# Patient Record
Sex: MALE | Race: WHITE
[De-identification: names, ages, dates, MRNs, and addresses within clinical notes are randomized per-mention and may not be internally consistent; named-entity substitution may affect disease eponyms.]

---

## 2018-02-02 ENCOUNTER — HOSPITAL ENCOUNTER (INPATIENT)
Dept: HOSPITAL 45 - C.MHU | Age: 48
LOS: 3 days | Discharge: HOME | DRG: 881 | End: 2018-02-05
Attending: PSYCHIATRY & NEUROLOGY | Admitting: PSYCHIATRY & NEUROLOGY
Payer: SELF-PAY

## 2018-02-02 VITALS
BODY MASS INDEX: 23.13 KG/M2 | WEIGHT: 161.6 LBS | HEIGHT: 70 IN | WEIGHT: 161.6 LBS | BODY MASS INDEX: 23.13 KG/M2 | HEIGHT: 70 IN

## 2018-02-02 VITALS — SYSTOLIC BLOOD PRESSURE: 141 MMHG | TEMPERATURE: 97.88 F | HEART RATE: 127 BPM | DIASTOLIC BLOOD PRESSURE: 79 MMHG

## 2018-02-02 DIAGNOSIS — F17.200: ICD-10-CM

## 2018-02-02 DIAGNOSIS — F32.9: Primary | ICD-10-CM

## 2018-02-02 DIAGNOSIS — E78.5: ICD-10-CM

## 2018-02-02 DIAGNOSIS — K21.9: ICD-10-CM

## 2018-02-02 DIAGNOSIS — Z79.899: ICD-10-CM

## 2018-02-02 RX ADMIN — NICOTINE SCH PATCH: 21 PATCH, EXTENDED RELEASE TRANSDERMAL at 14:21

## 2018-02-02 RX ADMIN — ATORVASTATIN CALCIUM SCH MG: 40 TABLET, FILM COATED ORAL at 21:17

## 2018-02-02 NOTE — PSYCHIATRIC HISTORY & PHYSICAL
History


Date of Service


Feb 2, 2018.





Identifying Data





Ty Carolina is a 47-year-old male admitted on Feb 2, 2018 at 12:15 who 

currently lives in [place] with his wife and mother-in-law.  Ty Carolina was 

admitted on a 201 voluntary commitment.  Patient is admitted after transfer 

from the [facility] ED.  The patient was brought to the unit after 

transfer from [facility].  Information provided by the patient is 

considered to be reliable, with exception of medication history.





Chief Complaint


"The obvious things is I lost my medication".





History of Present Illness


Ty Carolina is a 47-year-old  male admitted to [address] [place] [address] following 

acceptance from [facility] ED.  Pt reports having been off medications 

for the past month after losing his job and subsequently losing his insurance.  

He also reports increased stress as his mother-in-law has been living in his 

home and requires a considerable amount of care.  Pt states he and his wife 

were in an argument yesterday in which she had threatened to leave and he had 

said, "well if I don't have a job or a wife, I might as well just shoot myself"

.  Pt's mother and brother were informed if the comment and he was brought to 

the ED for mental health evaluation.  Pt was agreeable to inpatient treatment.  





Pt states he has struggled with depression for several years, with worsening in 

the past 2-3.  He reports low mood, decreased energy, hopelessness for the past 

few days, and feeling as though he is a burden.  Pt reports occasional passive 

SI over the past few years, but states he had never formed a plan or had intent 

or acts of furtherance.  He denies contemplation of suicide prior to the 

argument with his wife and states the comment lacked intent.  Pt reports 

anxiety as well, reporting irritability and feeling "on-edge".  He denies 

history of panic attacks.  





Pt current receives prescriptions from [facility].  He was prescribed 

risperidone 3mg, venlafaxine 150mg, and clonazepam 0.5mg BID-TID.  Pt denies SI/

HI, SIB, A/V hallucinations, paranoia, manuel, OCD, PTSD, eating disorder, and 

other psychosis.





Past Psychiatric History


Current OP Treatment:  psychiatrist (Evan)


Prior OP Treatment:  no prior treatment


Prior Psych Hospitalizations:  none


Access to a Gun:  No (per pt brother has his gun)


Suicide Attempts:  No





Past Medical/Surgical History


History of Concussion/Seizure:  No








Allergies


Allergies:  


Coded Allergies:  


     Ziprasidone (Unverified  Allergy, Unknown, SHORTNESS OF BREATH, 2/2/18)





Home Medications


Scheduled


Atorvastatin (Lipitor), 1 TAB PO HS


Fenofibrate (Tricor ), 134 MG PO DAILY


Omeprazole (Prilosec), 40 MG PO DAILY


Risperidone (Risperdal), 3 MG PO HS


Venlafaxine Hcl (Effexor Xr), 1 CAP PO DAILY





Scheduled PRN


Clonazepam (Klonopin), 0.5 MG PO TID PRN for anxiety/insomnia





Family History


History of Suicide:  No


History of Substance Abuse:  Yes (Dad - Alcoholic)


Psychiatric History:  No





Alcohol Use


Alcohol Use In Past 12 Months:  No


AUDIT Total Score:  0





Smoking Use


Smoking Status:  Current Every Day Smoker


1 - 1.5 ppd





Personal History


Lives in:  [place]


Childhood:


Pt was raised primarily by his mother after the death of his father.  He 

reports a diagnosis of dyslexia, difficulty concentrating, and unknown learning 

disability.


Education:  graduated from high school


Work History:


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  Reports being 

fired from his most recent position about 1 month ago.


Relationship History:  


Children:  none


Spiritual Affiliation:  none


Legal History:  none


Psychological Trauma History:  Denies Hx Traumatic Event





Review of Systems


Psych:  denies symptoms other than stated above


Constitutional: denied


Cardiovascular: denied


Respiratory:  reports chronic cough from smoking


GI: history of indigestion, worsening


Neurologic: denied


Remainder of 10 body systems also reviewed and denied other than noted above.





Examination


Physical Examination


A physical exam was performed in ER at [facility] by Gumaro HYDE

, prior to admission.  Pt received medical clearance from [facility] 

prior to transfer to this facility.  I accept that physical as correct/medical 

clearance for the inpatient physical exam.





Vital Signs





Vital Signs Past 12 Hours








  Date Time  Temp Pulse Resp B/P (MAP) Pulse Ox O2 Delivery O2 Flow Rate FiO2


 


2/2/18 13:17 36.6 127 18 141/79    











Mental Examination


During interview pt is:  alert and oriented, cooperative


Appearance:  appropriately dressed (in t-shirt and scrub pants), appropriately 

groomed


Eye contact is:  fair


Motor behavior is:  steady gait & station, no abnormal motor movements


Speech:  normal in rate, rhythm & volume


Affect:  depressed, tearful


Mood is:  depressed


Thought process:  goal directed, clear, coherent


Thought content:  reality based without delusions


Suicidal thought are:  denied (suicidal statement of "I'll just shoot myself" 

prior to transfer), Plan: denied, Intent: denied


Homicidal thoughts are:  denied


Hallucinations:  denies auditory, denies visual


Cognition:  attention grossly intact, language grossly intact, other (memory 

impairment with respect to medications)


Intelligence estimated to be:  average


Insight:  limited


Judgement:  limited





Impression / Recommendations


Impression


47-year-old  male admitted to [address] [place] [address] following acceptance from [facility] ED.  Pt states suicidal statements were made in the context of 

argument with his wife.  He denies SI prior to the argument and denies intent 

or acts of furtherance.  Pt reports worsening of mood as he has not been taking 

his medications for the past month.  He reports desire to be restarted on his 

medications.  As patient has lost his insurance and Medical Assistance coverage 

is not guaranteed, will plan to start on generic medications available through 

[facility]'s $4 prescription plan to maximize compliance once discharged.  Will 

start risperidone 1mg qHS with plan to convert to haloperidol at discharge.  Pt 

given risperidone 1mg BID prn for anxiety and agitation.  Will start fluoxetine 

20mg qAM due to availability and decent response to this medication in the 

past.  Pt informed of hydroxyzine available prn for anxiety with plans to 

discontinue clonazepam use if effective.  Diagnosis at this point is unclear as 

he reports depression and anxiety, but has been on at least ziprasidone and 

risperidone in the past, seeking clarification from outpatient records.  Pt has 

signed 72-hour notice shortly after admission.





Inventory Assets


Strengths:


willingness for treatment, stable outpatient provider


Needs:


mitigate risk factors, resolution of current relationship stressors





Risk Factors Assessment


Male:  Yes


:  Yes


/single/:  No


Access to guns:  No (per pt brother has his gun)


Health problems:  No


Mental Health Diagnoses:  Yes


Substance use disorders:  No


Previous attempt:  No


Previous psychiatric stay:  No


Hopelessness:  Yes


Smoker:  Yes





Protective Factors Assessment


Moravian beliefs:  No


:  Yes


Responsible for young children:  No


Employed:  No


Supportive family:  Yes


Good rapport with provider:  No





Recommendations





(1) Depressive disorder


2/2 


   - Start risperidone 1mg qHS, prn dosing of 1mg BID for anxiety/agitation.  

Started on fluoxetine 20mg qAM for antidepressant coverage.


   - Fasting glucose and lipid panel ordered for tomorrow AM.


   - Request records from patient's outpatient providers


   - Encourage participation in group programming including recreational 

therapy and group therapy


   - Encourage phone/in-person meeting with identified supports. 








CPT Code


Initial Hospital Care:  39897

## 2018-02-02 NOTE — PSYCHIATRIC HISTORY & PHYSICAL
Psychiatric History & Physical





Date of Service


Feb 2, 2018.





Identifying Data





Ty Carolina is a 47-year-old male admitted on Feb 2, 2018 at 12:15 who 

currently lives in [place] with his wife and mother-in-law.  Ty Carolina was 

admitted on a 201 voluntary commitment.  Patient is admitted after transfer 

from the [facility] ED.  The patient was brought to the unit after 

transfer from [facility].  Information provided by the patient is 

considered to be reliable, with exception of medication history.





Chief Complaint


"The obvious thing is I lost my medication".





History of Present Illness


Ty Carolina is a 47-year-old  male admitted to [address] [place] [address] following 

acceptance from [facility] ED.  Pt reports having been off medications 

for the past month after losing his job and subsequently losing his insurance.  

He also reports increased stress as his mother-in-law has been living in his 

home and requires a considerable amount of care.  Pt states he and his wife 

were in an argument yesterday in which she had threatened to leave and he had 

said, "well if I don't have a job or a wife, I might as well just shoot myself"

.  Pt's mother and brother were informed if the comment and he was brought to 

the ED for mental health evaluation.  Pt was agreeable to inpatient treatment.  





Pt told Farhad Watkins PA-C that he has struggled with depression for several 

years, with worsening in the past 2-3.  He reports low mood, decreased energy, 

hopelessness for the past few days, and feeling as though he is a burden.  Pt 

reports occasional passive SI over the past few years, but states he had never 

formed a plan or had intent or acts of furtherance.  He denies contemplation of 

suicide prior to the argument with his wife and states the comment lacked 

intent.  Pt reports anxiety as well, reporting irritability and feeling "on-edge

".  He denies history of panic attacks.  





Pt current receives prescriptions from [facility].  He was prescribed 

risperidone 3mg, venlafaxine 150mg, and clonazepam 0.5mg BID-TID.  Pt denies SI/

HI, SIB, A/V hallucinations, paranoia, manuel, OCD, PTSD, eating disorder, and 

other psychosis.





Past Psychiatric History


Current OP Treatment:  psychiatrist (George)


Prior OP Treatment:  no prior treatment


Prior Psych Hospitalizations:  none


Access to a Gun:  No (per pt brother has his gun)


Suicide Attempts:  No





Past Medical/Surgical History


History of Concussion/Seizure:  No








Allergies


Allergies:  


Coded Allergies:  


     Ziprasidone (Unverified  Allergy, Unknown, SHORTNESS OF BREATH, 2/2/18)





Home Medications


Scheduled


Atorvastatin (Lipitor), 1 TAB PO HS


Fenofibrate (Tricor ), 134 MG PO DAILY


Omeprazole (Prilosec), 40 MG PO DAILY


Risperidone (Risperdal), 3 MG PO HS


Venlafaxine Hcl (Effexor Xr), 1 CAP PO DAILY





Scheduled PRN


Clonazepam (Klonopin), 0.5 MG PO TID PRN for anxiety/insomnia





Family History


History of Suicide:  No


History of Substance Abuse:  Yes (Dad - Alcoholic)


Psychiatric History:  No





Alcohol Use


Alcohol Use In Past 12 Months:  No


AUDIT Total Score:  0





Smoking Use


Smoking Status:  Current Every Day Smoker


1 - 1.5 ppd





Personal History


Lives in:  [place]


Childhood:


Pt was raised primarily by his mother after the death of his father.  He 

reports a diagnosis of dyslexia, difficulty concentrating, and unknown learning 

disability.


Education:  graduated from high school


Work History:


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  Reports being 

fired from his most recent position about 1 month ago.


Relationship History:  


Children:  none


Spiritual Affiliation:  none


Legal History:  none


Psychological Trauma History:  Denies Hx Traumatic Event





Review of Systems


Psych:  denies symptoms other than stated above


Constitutional: denied


Cardiovascular: denied


Respiratory:  reports chronic cough from smoking


GI: history of indigestion, worsening


Neurologic: denied


Remainder of 10 body systems also reviewed and denied other than noted above.





Examination


Physical Examination


A physical exam was performed in ER at [facility] by Gumaro HYDE

, prior to admission.  Pt received medical clearance from [facility] 

prior to transfer to this facility.  I accept that physical as correct/medical 

clearance for the inpatient physical exam.





Vital Signs





Vital Signs Past 12 Hours








  Date Time  Temp Pulse Resp B/P (MAP) Pulse Ox O2 Delivery O2 Flow Rate FiO2


 


2/2/18 13:17 36.6 127 18 141/79    











Mental Examination


During interview pt is:  alert and oriented, cooperative


Appearance:  appropriately dressed (in t-shirt and scrub pants), appropriately 

groomed


Eye contact is:  fair


Motor behavior is:  steady gait & station, no abnormal motor movements


Speech:  normal in rate, rhythm & volume


Affect:  depressed, tearful


Mood is:  depressed


Thought process:  goal directed, clear, coherent


Thought content:  reality based without delusions


Suicidal thought are:  denied (suicidal statement of "I'll just shoot myself" 

prior to transfer), Plan: denied, Intent: denied


Homicidal thoughts are:  denied


Hallucinations:  denies auditory, denies visual


Cognition:  attention grossly intact, language grossly intact, other (memory 

impairment with respect to medications)


Intelligence estimated to be:  average


Insight:  limited


Judgement:  limited





Impression / Recommendations


Impression


47-year-old  male admitted to [address] [place] [address] following acceptance from [facility] ED.  Pt states suicidal statements were made in the context of 

argument with his wife.  He denies SI prior to the argument and denies intent 

or acts of furtherance.  Pt reports worsening of mood as he has not been taking 

his medications for the past month.  He reports desire to be restarted on his 

medications.  As patient has lost his insurance and Medical Assistance coverage 

is not guaranteed, will plan to start on generic medications available through 

[facility]'s $4 prescription plan to maximize compliance once discharged.  Will 

start risperidone 1mg qHS with plan to convert to haloperidol at discharge if 

cost remains a facot.  Pt given risperidone 1mg BID prn for anxiety and 

agitation.  Will start fluoxetine 20mg qAM due to availability and decent 

response to this medication in the past.  Pt informed of hydroxyzine available 

prn for anxiety with plans to discontinue clonazepam use if effective.  

Diagnosis at this point is unclear as he reports depression and anxiety, but 

has been on at least ziprasidone and risperidone in the past, seeking 

clarification from outpatient records.  Pt has signed 72-hour notice shortly 

after admission.





Inventory Assets


Strengths:


willingness for treatment, stable outpatient provider


Needs:


mitigate risk factors, resolution of current relationship stressors





Risk Factors Assessment


Male:  Yes


:  Yes


/single/:  No


Access to guns:  No (per pt brother has his gun)


Health problems:  No


Mental Health Diagnoses:  Yes


Substance use disorders:  No


Previous attempt:  No


Previous psychiatric stay:  No


Hopelessness:  Yes


Smoker:  Yes





Protective Factors Assessment


Restorationist beliefs:  No


:  Yes


Responsible for young children:  No


Employed:  No


Supportive family:  Yes


Good rapport with provider:  No





Recommendations





(1) Depressive disorder


2/2 


   - Restart risperidone at lower dose of 1mg qHS, prn dosing of 1mg BID for 

anxiety/agitation.  Started on fluoxetine 20mg qAM for antidepressant coverage.


   - Fasting glucose and lipid panel ordered for tomorrow AM as reviewed need 

for metabolic monitoring, baseline AIMS appears 0


   - Request records from patient's outpatient providers


   -The patient is admitted to [facility] ([facility] inpatient mental health unit) on 

q 15 min checks (behavioral with suicide precautions) for safety.  The patient 

will participate in group, recreational and milieu therapies and will be 

offered additional individual and family sessions as clinically appropriate.  








CPT Code


Initial Hospital Care:  23155

## 2018-02-03 VITALS — TEMPERATURE: 97.88 F | SYSTOLIC BLOOD PRESSURE: 116 MMHG | DIASTOLIC BLOOD PRESSURE: 87 MMHG | HEART RATE: 92 BPM

## 2018-02-03 LAB
KETONES UR QL STRIP: 158 MG/DL
PH UR: 239 MG/DL (ref 0–200)

## 2018-02-03 RX ADMIN — PANTOPRAZOLE SCH MG: 40 TABLET, DELAYED RELEASE ORAL at 07:43

## 2018-02-03 RX ADMIN — ATORVASTATIN CALCIUM SCH MG: 40 TABLET, FILM COATED ORAL at 21:10

## 2018-02-03 RX ADMIN — FLUOXETINE SCH MG: 20 CAPSULE ORAL at 07:43

## 2018-02-03 RX ADMIN — HALOPERIDOL SCH MG: 1 TABLET ORAL at 21:08

## 2018-02-03 RX ADMIN — NICOTINE SCH PATCH: 21 PATCH, EXTENDED RELEASE TRANSDERMAL at 07:42

## 2018-02-03 RX ADMIN — Medication SCH INTER.UNIT: at 07:43

## 2018-02-03 NOTE — PSYCHIATRIC PROGRESS NOTES
Progress Note


Date of Service


Feb 3, 2018.





Interval History


Ty Carolina is a 47-year-old male admitted on Feb 2, 2018 at 12:15 who 

currently lives in [place] with his wife and mother-in-law.  Ty Carolina was 

admitted on a 201 voluntary commitment.  Patient is admitted after transfer 

from the [facility] ED.  The patient was brought to the unit after 

transfer from [facility].  Information provided by the patient is 

considered to be reliable, with exception of medication history.





Chief Complaint


"I had a hard time sleeping".





Subjective


Patient was seen & assessed interval progress reviewed with Nursing.  Staff 

reports calls made to [facility] ED to discuss transportation, awaiting response.  

Pt follows with psychiatric providers at [facility].  Pt was seen 

today to assess progress since admission.  Pt reports feeling "better since I'

ve gotten here".  He reports difficulty sleeping last evening and received a 

prn dose of Vistaril for assistance.  Pt is continuing to endorse his plan to 

leave at the end of his 72-hour notice up on 2/5 at ~1300.  Pt states he called 

his mother last evening and was able to text his wife to let her know he was 

doing well.  Pt states, per his mother, his wife is still in their home 

awaiting his return.  We reviewed lab results including elevated fasting glucose

, triglycerides, and cholesterol.  Medication changes were also discussed to 

allow for compliance in regard to cost.  Pt participated appropriately in the 

conversation.  He denies SI/HI, A/V hallucinations.  He denies other concerns 

or needs today.





Review of Systems


Psych:  denies symptoms other than stated above


Constitutional: denied


Cardiovascular: denied


GI: denied


Neurologic: denied


Remainder of 10 body systems also reviewed and denied other than noted above.





Sleep Information


Total Hours of Sleep:  10.00





Meal Information


Percent of Breakfast Consumed:  75


Percent of Lunch Consumed:  35


Percent of Dinner Consumed:  0





Mental Status Exam


During interview pt is:  alert and oriented, cooperative


Appearance:  appropriately dressed, appropriately groomed


Eye contact is:  good


Motor behavior is:  steady gait & station, no abnormal motor movements


Speech:  normal in rate, rhythm & volume


Affect:  blunted


Mood is:  other ("better")


Thought process:  goal directed, clear, coherent


Thought content:  reality based without delusions


Suicidal thought are:  denied (admitted following threat to "shoot myself"), 

Plan: denied, Intent: denied


Homicidal thoughts are:  denied


Hallucinations:  denies auditory, denies visual


Cognition:  attention grossly intact, language grossly intact, other (memory 

impairment with respect to medications)


Intelligence estimated to be:  average


Insight:  limited


Judgement:  limited





Impression


Improvement reported in mood since admission.  72-hour notice up 2/5 at ~1300.  

Unable to obtain records from patient's outpatient provider to clarify diagnosis

, and will therefore keep low-dose antipsychotic on board.  Pt agreeable to 

trial of 1mg Haldol qHS to determine response and potential side effects prior 

to discharge.  Attempts being made to consolidate medications to those 

available on the $4 list through his [facility] pharmacy.  Risks, benefits, side 

effects, and alternatives were discussed.  Pt verbalized understanding and is 

agreeable to the plan above.  Will continue Prozac at 20mg at first dose was 

this AM.  Pt encouraged to request Vistaril as needed for anxiety throughout 

the day. Pt requires ongoing inpatient mental health treatment in order to 

clarify diagnosis, make medication changes, and assess response.  Pt is at risk 

of decompensation and self-harm if discharged prematurely.





Plan





(1) Depressive disorder


2/2 


   - Start risperidone 1mg qHS, prn dosing of 1mg BID for anxiety/agitation.  

Started on fluoxetine 20mg qAM for antidepressant coverage.


   - Fasting glucose and lipid panel ordered for tomorrow AM.


   - Request records from patient's outpatient providers


   - Encourage participation in group programming including recreational 

therapy and group therapy


   - Encourage phone/in-person meeting with identified supports. 





2/3 


   - D/C Risperdal in favor of Haldol 1mg qHS.  Continue Prozac 20mg as above. 

Encourage use of prn Vistaril for anxiety.


   - Fasting labs reviewed.  Elevated glucose-106; triglycerides-285, and total 

cholesterol-239.  Other parameters within normal range.  Discussed need for 

ongoing monitoring with antipsychotic treatment as patient is already receiving 

medications for hyperlipidemia. 


   - Pt receiving Vitamin D supplementation daily for low Vitamin D level per 

-[place] ED labs. 


   - Encourage participation in group programming


   - Attempt to schedule phone meetings with wife or other family members. 








Discharge / Aftercare Planning


Primary Care Physician:  


   Name:  dewayne.


Therapist:  


   Name:  amanda


:  


   Name:  amanda





Visit Code


E&M Code:  02484





Inventory Assets


Strengths:


willingness for treatment, stable outpatient provider


Needs:


mitigate risk factors, resolution of current relationship stressors





Risk Factors Assessment


Male:  Yes


:  Yes


/single/:  No


Health problems:  No


Mental Health Diagnoses:  Yes


Substance use disorders:  No


Previous attempt:  No


Previous psychiatric stay:  No


Hopelessness:  Yes


Smoker:  Yes





Protective Factors Assessment


Latter day beliefs:  No


:  Yes


Responsible for young children:  No


Employed:  No


Supportive family:  Yes


Good rapport with provider:  No





Data


Vital Signs Last 24 Hrs:











  Date Time  Temp Pulse Resp B/P (MAP) Pulse Ox O2 Delivery O2 Flow Rate FiO2


 


2/3/18 07:01 36.6 92 16 101/70    





  118  116/87    








Meds Administered Last 24 Hrs:





Meds Administered (Past 24Hrs)








 Medications


  (Trade)  Dose


 Ordered  Sig/Ivan


 Route  Start Time


 Stop Time Status Last Admin


Dose Admin


 


 Hydroxyzine HCl


  (Vistaril Tab)  25 mg  Q4H  PRN


 PO  2/2/18 13:00


 3/4/18 12:59  2/3/18 09:55


25 MG


 


 Nicotine


  (Nicoderm Cq


 21MG Patch)  1 patch  QAM


 EXT  2/2/18 14:00


 3/4/18 13:59  2/3/18 07:42


1 PATCH


 


 Miscellaneous


  (Remove Nicoderm


 Patch)  1 ea  QAM


 N/A  2/3/18 09:00


 3/5/18 08:59  2/3/18 07:42


1 EA


 


 Fluoxetine HCl


  (Prozac Cap)  20 mg  QAM


 PO  2/3/18 09:00


 3/5/18 08:59  2/3/18 07:43


20 MG


 


 Risperidone


  (Risperdal Tab)  1 mg  HS


 PO  2/2/18 22:00


 2/3/18 09:00 DC 2/2/18 21:17


1 MG


 


 Risperidone


  (Risperdal Tab)  1 mg  BID  PRN


 PO  2/2/18 15:45


 2/3/18 09:00 DC 2/3/18 07:43


1 MG


 


 Hydroxyzine HCl


  (Vistaril Tab)  50 mg  HS  ONCE


 PO  2/2/18 22:00


 2/2/18 22:01 DC 2/2/18 21:18


50 MG


 


 Atorvastatin


 Calcium


  (Lipitor Tab)  80 mg  HS


 PO  2/2/18 22:00


 3/4/18 21:59  2/2/18 21:17


80 MG


 


 Pantoprazole


 Sodium


  (Protonix Tab)  40 mg  DAILY


 PO  2/3/18 09:00


 3/5/18 08:59  2/3/18 07:43


40 MG


 


 Cholecalciferol


  (Vitamin D Tab)  1,000


 inter.unit  QAM


 PO  2/3/18 09:00


 3/5/18 08:59  2/3/18 07:43


1,000 INTER.UNIT








Lab Results Last 24 Hrs:





Last 24 Hours








Test


  2/3/18


07:05


 


Fasting Glucose 106 mg/dl 


 


Triglycerides Level 285 mg/dl 


 


Cholesterol Level 239 mg/dl 


 


HDL Cholesterol 24 mg/dl 


 


LDL Cholesterol, Calculated 158 mg/dl 


 


VLDL Cholesterol, Calculated 57 mg/dl 


 


Cholesterol/HDL Ratio 10.0

## 2018-02-04 VITALS — TEMPERATURE: 98.06 F | HEART RATE: 101 BPM | DIASTOLIC BLOOD PRESSURE: 72 MMHG | SYSTOLIC BLOOD PRESSURE: 103 MMHG

## 2018-02-04 RX ADMIN — FLUOXETINE SCH MG: 20 CAPSULE ORAL at 08:02

## 2018-02-04 RX ADMIN — NICOTINE SCH PATCH: 21 PATCH, EXTENDED RELEASE TRANSDERMAL at 08:01

## 2018-02-04 RX ADMIN — HALOPERIDOL SCH MG: 1 TABLET ORAL at 21:15

## 2018-02-04 RX ADMIN — ATORVASTATIN CALCIUM SCH MG: 40 TABLET, FILM COATED ORAL at 21:15

## 2018-02-04 RX ADMIN — PANTOPRAZOLE SCH MG: 40 TABLET, DELAYED RELEASE ORAL at 08:02

## 2018-02-04 RX ADMIN — Medication SCH INTER.UNIT: at 08:02

## 2018-02-04 NOTE — PSYCHIATRIC PROGRESS NOTES
Progress Note


Date of Service


Feb 4, 2018.





Interval History


Ty Carolina is a 47-year-old male admitted on Feb 2, 2018 at 12:15 who 

currently lives in [place] with his wife and mother-in-law.  Ty Carolina was 

admitted on a 201 voluntary commitment.  Patient is admitted after transfer 

from the [facility] ED.  The patient was brought to the unit after 

transfer from [facility].  Information provided by the patient is 

considered to be reliable, with exception of medication history.





Chief Complaint


"Well I feel like I'm doing better, but I'm not getting any sleep, being away".





Subjective


Patient was seen & assessed interval progress reviewed with Nursing. Staff 

report he slept much of the day, and is requesting hydroxyzine frequently 

throughout the day. He has a family meeting tomorrow with his mother and 

brother. He signed an CAITLIN for his wife, but hasn't been able to reach her. 

Today he says his mood is a little better, but he is not sleeping well here, 

which he attributes to being away form home, saying this has always been a 

problem. He thinks his mood is improved due to medication adjustments and being 

put back on meds. Talking to his wife has also helped, notes she is stressed as 

she is taking care of her mother. He talks about his multiple stressors, losing 

his job, which resulted in losing his insurance, and then inability to get his 

meds. He says the fight with his wife was the final straw that "got me here." 

He says his mother in law lives with them and her health is declining, and 

"there are some things I just won't do," like cleaning up in continence, "I 

just don't have the stomach." This caused an argument with his wife, which 

progressed to "me being out of work, bills, I just got to the point where I 

said if your mother can't help (with the bills), then she can't stay here." His 

wife said that if her mother couldn't stay, then she would leave to, "and that 

really upset me, I didn't want her to leave." He just talked to her today, and 

says "she misses me, and I miss her." He feels much better since talking to his 

wife and learning that she is not leaving him. He denies SI. His mother and 

brother are coming tomorrow and he hopes they can take him home.





Sleep Information


Total Hours of Sleep:  7.75





Meal Information


Percent of Breakfast Consumed:  75


Percent of Lunch Consumed:  100


Percent of Dinner Consumed:  100





Mental Status Exam


During interview pt is:  alert and oriented, cooperative


Appearance:  appropriately dressed, appropriately groomed


Eye contact is:  good


Motor behavior is:  steady gait & station, psychomotor agitation (jiggling leg )


Speech:  normal in rate, rhythm & volume


Affect:  anxious, constricted


Mood is:  other ("better")


Thought process:  goal directed, clear, coherent


Thought content:  reality based without delusions


Suicidal thought are:  denied (admitted to threatening to "shoot myself"), Plan

: denied, Intent: denied


Homicidal thoughts are:  denied


Hallucinations:  denies auditory, denies visual


Cognition:  attention grossly intact, language grossly intact


Intelligence estimated to be:  average


Insight:  limited


Judgement:  limited





Impression


Improvement reported in mood since admission, after talking to wife and 

reconciling.  72-hour notice up 2/5 at ~1300.  Needs to work on safety plan, 

and has meeting with his mother and brother on Monday.  Unable to obtain 

records from patient's outpatient provider to clarify diagnosis, and will 

therefore keep low-dose antipsychotic on board.  Switched from risperidone to 

1mg Haldol qHS (as risperidone not affordable), to determine response and 

potential side effects prior to discharge.  Attempts being made to utilize 

medications to those available on the $4 list through [email or web address] pharmacy.  Pt 

requires ongoing inpatient mental health treatment in order to clarify diagnosis

, make medication changes, and assess response.  Pt is at risk of 

decompensation and self-harm if discharged prematurely.





Plan





(1) Depressive disorder


2/2 


   - Start risperidone 1mg qHS, prn dosing of 1mg BID for anxiety/agitation.  

Started on fluoxetine 20mg qAM for antidepressant coverage.


   - Fasting glucose and lipid panel ordered for tomorrow AM.


   - Request records from patient's outpatient providers


   - Encourage participation in group programming including recreational 

therapy and group therapy


   - Encourage phone/in-person meeting with identified supports. 





2/3 


   - D/C Risperdal in favor of Haldol 1mg qHS.  Continue Prozac 20mg as above. 

Encourage use of prn Vistaril for anxiety.


   - Fasting labs reviewed.  Elevated glucose-106; triglycerides-285, and total 

cholesterol-239.  Other parameters within normal range.  Discussed need for 

ongoing monitoring with antipsychotic treatment as patient is already receiving 

medications for hyperlipidemia. 


   - Pt receiving Vitamin D supplementation daily for low Vitamin D level per 

[facility] labs. 


   - Encourage participation in group programming


   - Attempt to schedule phone meetings with wife or other family members. 


2/4


   - Continue haloperidol and fluoxetine. 


   - Meeting with wife by phone today, and with his mother and brother tomorrow.


   - Encourage patient and his wife to explore options for in home help with 

care of patient's MIL, as this is primary stressor.








Discharge / Aftercare Planning


Primary Care Physician:  


   Name:  dewayne.


Therapist:  


   Name:  amanda


:  


   Name:  amanda





Visit Code


E&M Code:  20989





Inventory Assets


Strengths:


willingness for treatment, stable outpatient provider


Needs:


mitigate risk factors, resolution of current relationship stressors





Risk Factors Assessment


Male:  Yes


:  Yes


/single/:  No


Higher / Fall in social status:  No


Health problems:  No


Mental Health Diagnoses:  Yes


Substance use disorders:  No


Previous attempt:  No


Previous psychiatric stay:  No


Hopelessness:  Yes


Smoker:  Yes





Protective Factors Assessment


Scientologist beliefs:  No


:  Yes


Responsible for young children:  No


Employed:  No


Supportive family:  Yes


Good rapport with provider:  No





Data


Vital Signs Last 24 Hrs:











  Date Time  Temp Pulse Resp B/P (MAP) Pulse Ox O2 Delivery O2 Flow Rate FiO2


 


2/4/18 06:58 36.7 101 16 103/72    





  116  115/85    








Meds Administered Last 24 Hrs:





Meds Administered (Past 24Hrs)








 Medications


  (Trade)  Dose


 Ordered  Sig/Ivan


 Route  Start Time


 Stop Time Status Last Admin


Dose Admin


 


 Hydroxyzine HCl


  (Vistaril Tab)  50 mg  HSZ  PRN


 PO  2/2/18 13:00


 3/4/18 12:59  2/3/18 21:11


50 MG


 


 Hydroxyzine HCl


  (Vistaril Tab)  25 mg  Q4H  PRN


 PO  2/2/18 13:00


 3/4/18 12:59  2/3/18 14:30


25 MG


 


 Nicotine


  (Nicoderm Cq


 21MG Patch)  1 patch  QAM


 EXT  2/2/18 14:00


 3/4/18 13:59  2/3/18 07:42


1 PATCH


 


 Miscellaneous


  (Remove Nicoderm


 Patch)  1 ea  QAM


 N/A  2/3/18 09:00


 3/5/18 08:59  2/3/18 07:42


1 EA


 


 Fluoxetine HCl


  (Prozac Cap)  20 mg  QAM


 PO  2/3/18 09:00


 3/5/18 08:59  2/3/18 07:43


20 MG


 


 Risperidone


  (Risperdal Tab)  1 mg  HS


 PO  2/2/18 22:00


 2/3/18 09:00 DC 2/2/18 21:17


1 MG


 


 Risperidone


  (Risperdal Tab)  1 mg  BID  PRN


 PO  2/2/18 15:45


 2/3/18 09:00 DC 2/3/18 07:43


1 MG


 


 Hydroxyzine HCl


  (Vistaril Tab)  50 mg  HS  ONCE


 PO  2/2/18 22:00


 2/2/18 22:01 DC 2/2/18 21:18


50 MG


 


 Atorvastatin


 Calcium


  (Lipitor Tab)  80 mg  HS


 PO  2/2/18 22:00


 3/4/18 21:59  2/3/18 21:10


80 MG


 


 Pantoprazole


 Sodium


  (Protonix Tab)  40 mg  DAILY


 PO  2/3/18 09:00


 3/5/18 08:59  2/3/18 07:43


40 MG


 


 Cholecalciferol


  (Vitamin D Tab)  1,000


 inter.unit  QAM


 PO  2/3/18 09:00


 3/5/18 08:59  2/3/18 07:43


1,000 INTER.UNIT


 


 Haloperidol


  (Haldol Tab)  1 mg  HS


 PO  2/3/18 22:00


 3/5/18 21:59  2/3/18 21:08


1 MG








Lab Results Last 24 Hrs:





Last 24 Hours








Test


  2/3/18


07:05


 


Fasting Glucose 106 mg/dl 


 


Triglycerides Level 285 mg/dl 


 


Cholesterol Level 239 mg/dl 


 


HDL Cholesterol 24 mg/dl 


 


LDL Cholesterol, Calculated 158 mg/dl 


 


VLDL Cholesterol, Calculated 57 mg/dl 


 


Cholesterol/HDL Ratio 10.0

## 2018-02-05 VITALS — DIASTOLIC BLOOD PRESSURE: 69 MMHG | TEMPERATURE: 97.52 F | HEART RATE: 109 BPM | SYSTOLIC BLOOD PRESSURE: 106 MMHG

## 2018-02-05 RX ADMIN — PANTOPRAZOLE SCH MG: 40 TABLET, DELAYED RELEASE ORAL at 08:09

## 2018-02-05 RX ADMIN — Medication SCH INTER.UNIT: at 08:09

## 2018-02-05 RX ADMIN — FLUOXETINE SCH MG: 20 CAPSULE ORAL at 08:09

## 2018-02-05 RX ADMIN — NICOTINE SCH PATCH: 21 PATCH, EXTENDED RELEASE TRANSDERMAL at 08:09
